# Patient Record
Sex: FEMALE | Race: WHITE | Employment: FULL TIME | URBAN - METROPOLITAN AREA
[De-identification: names, ages, dates, MRNs, and addresses within clinical notes are randomized per-mention and may not be internally consistent; named-entity substitution may affect disease eponyms.]

---

## 2018-03-27 ENCOUNTER — OFFICE VISIT (OUTPATIENT)
Dept: ORTHOPEDIC SURGERY | Age: 63
End: 2018-03-27

## 2018-03-27 VITALS
HEART RATE: 61 BPM | WEIGHT: 201 LBS | RESPIRATION RATE: 15 BRPM | SYSTOLIC BLOOD PRESSURE: 191 MMHG | DIASTOLIC BLOOD PRESSURE: 87 MMHG | HEIGHT: 63 IN | OXYGEN SATURATION: 97 % | TEMPERATURE: 98.6 F | BODY MASS INDEX: 35.61 KG/M2

## 2018-03-27 DIAGNOSIS — M54.12 CERVICAL RADICULOPATHY: ICD-10-CM

## 2018-03-27 DIAGNOSIS — R29.898 LEFT ARM WEAKNESS: Primary | ICD-10-CM

## 2018-03-27 DIAGNOSIS — M79.18 MYOFASCIAL PAIN: ICD-10-CM

## 2018-03-27 RX ORDER — FENOFIBRATE 160 MG/1
TABLET ORAL
COMMUNITY
Start: 2018-02-21 | End: 2021-12-02 | Stop reason: SDUPTHER

## 2018-03-27 RX ORDER — METFORMIN HYDROCHLORIDE 500 MG/1
500 TABLET ORAL
COMMUNITY
End: 2021-12-02 | Stop reason: SDUPTHER

## 2018-03-27 RX ORDER — NEBIVOLOL HYDROCHLORIDE 10 MG/1
TABLET ORAL
COMMUNITY
Start: 2018-02-21 | End: 2021-12-02 | Stop reason: SDUPTHER

## 2018-03-27 RX ORDER — VALSARTAN 320 MG/1
TABLET ORAL
COMMUNITY
Start: 2018-03-20

## 2018-03-27 RX ORDER — PREDNISONE 10 MG/1
TABLET ORAL
Qty: 21 TAB | Refills: 0 | Status: SHIPPED | OUTPATIENT
Start: 2018-03-27 | End: 2018-04-23 | Stop reason: ALTCHOICE

## 2018-03-27 RX ORDER — CYCLOBENZAPRINE HCL 10 MG
TABLET ORAL
Refills: 0 | COMMUNITY
Start: 2018-03-15

## 2018-03-27 RX ORDER — ATORVASTATIN CALCIUM 40 MG/1
TABLET, FILM COATED ORAL
Refills: 1 | COMMUNITY
Start: 2018-02-13

## 2018-03-27 NOTE — MR AVS SNAPSHOT
[facility]. [facility] [address] [place] [id number] 
[phone] Patient: Sheyla Connolly MRN: [id number] HXI:[id number] Visit Information Date & Time Provider Department Dept. Phone Encounter #  
 3/27/2018  1:00 PM Kenney Bowling MD [facility] [phone] [id number] Follow-up Instructions Return in about 2 weeks (around 4/10/2018). Upcoming Health Maintenance Date Due Hepatitis C Screening 1955 DTaP/Tdap/Td series (1 - Tdap) 4/12/1977 PAP AKA CERVICAL CYTOLOGY 4/12/1977 BREAST CANCER SCRN MAMMOGRAM 4/12/2006 FOBT Q 1 YEAR AGE 50-75 4/12/2006 ZOSTER VACCINE AGE 60> 2/12/2016 Influenza Age 5 to Adult 8/1/2017 Allergies as of 3/27/2018  Review Complete On: 3/27/2018 By: Keyanna Navas No Known Allergies Current Immunizations  Never Reviewed No immunizations on file. Not reviewed this visit You Were Diagnosed With   
  
 Codes Comments Left arm weakness    -  Primary ICD-10-CM: R29.898 ICD-9-CM: 729.89 Cervical radiculopathy     ICD-10-CM: M54.12 
ICD-9-CM: 723.4 Myofascial pain     ICD-10-CM: M79.1 ICD-9-CM: 729.1 Vitals BP Pulse Temp Resp Height(growth percentile) Weight(growth percentile) 191/87 61 98.6 °F (37 °C) 15 5' 3\" (1.6 m) 201 lb (91.2 kg) SpO2 BMI Smoking Status 97% 35.61 kg/m2 Never Smoker BMI and BSA Data Body Mass Index Body Surface Area  
 35.61 kg/m 2 2.01 m 2 Preferred Pharmacy Pharmacy Name Phone [id number], [address] [place] [address] Your Updated Medication List  
  
   
This list is accurate as of 3/27/18  2:14 PM.  Always use your most recent med list.  
  
  
  
  
 atorvastatin 40 mg tablet Commonly known as:  LIPITOR  
TAKE 1 TABLET BY MOUTH EVERY DAY FOR CHOLESTEROL BYSTOLIC 10 mg tablet Generic drug:  nebivolol  
  
 cyclobenzaprine 10 mg tablet Commonly known as:  FLEXERIL  
TAKE 1 TAB BEFORE BEDTIME, CAUTION SEDATION. CAN TAKE 3 X A DAY WHEN NOT WORKING/OR DRIVING. fenofibrate 160 mg tablet Commonly known as:  LOFIBRA  
  
 metFORMIN 500 mg tablet Commonly known as:  GLUCOPHAGE  
500 mg.  
  
 predniSONE 10 mg dose pack Commonly known as:  STERAPRED DS See administration instruction per 10mg dose pack  
  
 valsartan 320 mg tablet Commonly known as:  DIOVAN Prescriptions Sent to Pharmacy Refills  
 predniSONE (STERAPRED DS) 10 mg dose pack 0 Sig: See administration instruction per 10mg dose pack Class: Normal  
 Pharmacy: [address] [place] [address] 107, Maxime Yu [address] Ph #: [phone] We Performed the Following REFERRAL TO PHYSICAL THERAPY [[address]] Comments:  
 eval and treat Neck pain, L>R Include dry needling protocol Follow-up Instructions Return in about 2 weeks (around 4/10/2018). To-Do List   
 04/03/2018 Imaging:  MRI CERV SPINE WO CONT   
  
 04/04/2018 1:30 PM  
  Appointment with [facility] MRI  1 at [address] [place] [address] ([phone]) GENERAL INSTRUCTIONS  Bring information (ID card) if you have any medically implanted devices. You will be required to lie still while the procedure is being performed. Remove any jewelry (including body piercing, hairpins) prior to MRI. If you have had a creatinine level drawn within the past 30 days, please bring most recent results to your appt. Bring any films, CD's, and reports related to your study with you on the day of your exam.  This only includes studies done outside of [address] [place] [address], Sal , Claribel, and Kendy.   Bring a complete list of all medications you are currently taking to include prescriptions, over-the-counter meds, herbals, vitamins & any dietary supplements. If you were given medications for claustrophobia or anxiety, please arrange to have someone drive you to your appointment. QUESTIONS  Notify the MRI Department if you have any questions concerning your study. Geni Dash - [phone] [address] Samantha Andrews - [phone] Referral Information Referral ID Referred By Referred To  
  
 [id number] [address] [place], [address] Phone: [phone] Visits Status Start Date End Date 1 New Request 3/27/18 3/27/19 If your referral has a status of pending review or denied, additional information will be sent to support the outcome of this decision. Introducing [facility]! Veena Dye introduces SANDOW patient portal. Now you can access parts of your medical record, email your doctor's office, and request medication refills online. 1. In your internet browser, go to [email or web address] 2. Click on the First Time User? Click Here link in the Sign In box. You will see the New Member Sign Up page. 3. Enter your SANDOW Access Code exactly as it appears below. You will not need to use this code after youve completed the sign-up process. If you do not sign up before the expiration date, you must request a new code. · SANDOW Access Code: [email or web address] Expires: 6/25/2018  2:09 PM 
 
4. Enter the last four digits of your Social Security Number (xxxx) and Date of Birth (mm/dd/yyyy) as indicated and click Submit. You will be taken to the next sign-up page. 5. Create a ITaot ID. This will be your SANDOW login ID and cannot be changed, so think of one that is secure and easy to remember. 6. Create a SANDOW password. You can change your password at any time. 7. Enter your Password Reset Question and Answer. This can be used at a later time if you forget your password. 8. Enter your e-mail address. You will receive e-mail notification when new information is available in [address] 9. Click Sign Up. You can now view and download portions of your medical record. 10. Click the Download Summary menu link to download a portable copy of your medical information. If you have questions, please visit the Frequently Asked Questions section of the SEDLine website. Remember, SEDLine is NOT to be used for urgent needs. For medical emergencies, dial 911. Now available from your iPhone and Android! Please provide this summary of care documentation to your next provider. Your primary care clinician is listed as Mike Tyler. If you have any questions after today's visit, please call [phone].

## 2018-03-27 NOTE — PROGRESS NOTES
[address], [address]  [place] [address]  Phone: [phone]  Fax: [phone]        Stew Mcfarland  : 1955  PCP: No primary care provider on file. 3/27/2018    NEW PATIENT      ASSESSMENT AND PLAN     Rosey Bates comes in to the office today c/o chronic neck and left shoulder pain with occasional paraesthesia in her LUE, worse over the last 2 weeks. She rates her pain as an 8/10 today. Her paraesthesia crosses multiple dermatomes, particularly C5-C8. The majority of her pain is in her neck. On examination, she had a generalized weakness in her LUE and a negative Hawkin's sign on her L. She had tenderness to palpation of her trapezius and levator scapulae on the L with trigger points. Her pain is likely myofascial in nature, but her generalized LUE weakness is worrying. I referred for a cervical MRI for further evaluation of her LUE weakness. I also prescribed a prednisone pack and referred to PT with dry needling. I advised on proper biomechanics and posture. Pt will f/u in 2 weeks or sooner if needed. Diagnoses and all orders for this visit:    1. Left arm weakness  -     MRI CERV SPINE WO CONT; Future    2. Cervical radiculopathy  -     MRI CERV SPINE WO CONT; Future  -     predniSONE (STERAPRED DS) 10 mg dose pack; See administration instruction per 10mg dose pack    3. Myofascial pain  -     REFERRAL TO PHYSICAL THERAPY         Follow-up Disposition: Not on File    CHIEF COMPLAINT  Rosey Bates is seen today in consultation at the request of No primary care provider on file. for complaints of neck pain. HISTORY OF PRESENT ILLNESS  Rosey Bates is a 65 y.o. female c/o chronic neck pain radiating into her left shoulder and she has occasional paraesthesia in her LUE, worse over the last 2 weeks. She had a cortisone shot in her left shoulder that relieved 80% of her pain.  She has also had massage, ice and heat therapy, and Flexeril at night that have all provided temporary relief of her pain. She notes her pain is worse at the end of the work day than at the beginning. She works as a . She is accompanied by her . Pt denies any fevers, chills, nausea, vomiting. Pt denies any chest pain and shortness of breath. Pt denies any ear, nose, and throat problems. Pt denies any fecal or urinary incontinence. PAST MEDICAL HISTORY   No past medical history on file. No past surgical history on file. MEDICATIONS        ALLERGIES  Allergies not on file       SOCIAL HISTORY    Social History     Social History    Marital status: UNKNOWN     Spouse name: N/A    Number of children: N/A    Years of education: N/A     Social History Main Topics    Smoking status: Not on file    Smokeless tobacco: Not on file    Alcohol use Not on file    Drug use: Not on file    Sexual activity: Not on file     Other Topics Concern    Not on file     Social History Narrative       FAMILY HISTORY  No family history on file. REVIEW OF SYSTEMS  Review of Systems   Musculoskeletal: Positive for neck pain. LUE paraesthesia         PHYSICAL EXAMINATION  Visit Vitals    /87    Pulse 61    Temp 98.6 °F (37 °C)    Resp 15    Ht 5' 3\" (1.6 m)    Wt 201 lb (91.2 kg)    SpO2 97%    BMI 35.61 kg/m2         No flowsheet data found. Constitutional:  Well developed, well nourished, in no acute distress. Psychiatric: Affect and mood are appropriate. HEENT: Normocephalic, atraumatic. Extraocular movements intact. Integumentary: No rashes or abrasions noted on exposed areas. Cardiovascular: Regular rate and rhythm. Pulmonary: Clear to auscultation bilaterally. SPINE/MUSCULOSKELETAL EXAM    Cervical spine:  Neck is midline. Normal muscle tone. No focal atrophy is noted. ROM pain free. Shoulder ROM intact. Tenderness to palpation of trapezius and levator scapulae on the L.   Negative Spurling's sign. Negative Tinel's sign. Negative Baig's sign. Sensation in the bilateral arms grossly intact to light touch. Lumbar spine:  No rash, ecchymosis, or gross obliquity. No fasciculations. No focal atrophy is noted. No pain with hip ROM. Full range of motion. No tenderness to palpation. No tenderness to palpation at the sciatic notch. SI joints non-tender. Trochanters non tender. Sensation in the bilateral legs grossly intact to light touch. MOTOR:      Biceps  Triceps Deltoids Wrist Ext Wrist Flex Hand Intrin   Right 5/5 5/5 5/5 5/5 5/5 5/5   Left 5/5 5/5 4/5 4/5 5/5 5/5             Hip Flex  Quads Hamstrings Ankle DF EHL Ankle PF   Right 5/5 5/5 5/5 5/5 5/5 5/5   Left 5/5 5/5 5/5 5/5 5/5 5/5   Pain limited exam due to left shoulder pain. DTRs are 1+ biceps, triceps, brachioradialis, patella, and Achilles. Negative Straight Leg raise. Squat not tested. No difficulty with tandem gait. Ambulation without assistive device. FWB. RADIOGRAPHS  Cervical spine XR with US images taken on 3/16/18 personally reviewed with patient:  FINDINGS:     Some views are limited by motion. There is straightening of the normal cervical lordosis. The anterior neural arch of C1 is in close approximation to the odontoid process with proper alignment of the lateral masses of C1 and C2. Prevertebral soft tissues are unremarkable. Vertebral body heights are maintained. There is intervertebral disc space narrowing with endplate osteophytosis from C4-C7. There are degenerative changes throughout the facet and uncovertebral joints resulting in mild left neuroforaminal narrowing at C4-C5 and mild right neuroforaminal narrowing at C4-C5 and C5-C6. The lung apices are clear. IMPRESSION:     Degenerative disc disease from C4-C7 with diffuse cervical facet arthropathy. There is mild bilateral neuroforaminal stenosis.  If there are true radicular type symptoms, consider MRI for further evaluation.  reviewed    Ms. Daquan Perry has a reminder for a \"due or due soon\" health maintenance. I have asked that she contact her primary care provider for follow-up on this health maintenance. 24 minutes of face-to-face contact were spent with the patient during today's visit extensively discussing symptoms and treatment plan. All questions were answered. More than half of this visit today was spent on counseling. Written by Jason Lao, as dictated by Dr. Yuriy Gray. I, Dr. Yuriy Gray, confirm that all documentation is accurate.

## 2018-03-29 ENCOUNTER — HOSPITAL ENCOUNTER (OUTPATIENT)
Dept: PHYSICAL THERAPY | Age: 63
Discharge: HOME | End: 2018-03-29
Payer: COMMERCIAL

## 2018-03-29 PROCEDURE — 97110 THERAPEUTIC EXERCISES: CPT

## 2018-03-29 PROCEDURE — 97162 PT EVAL MOD COMPLEX 30 MIN: CPT

## 2018-03-29 NOTE — PROGRESS NOTES
PT DAILY TREATMENT NOTE     Patient Name: Regina Yung  Date:3/29/2018  : 1955  [x]  Patient  Verified  Payor: [place] [place] / Plan: Rosalie White [id number] PPO / Product Type: PPO /    In time:   Out time:180  Total Treatment Time (min): 60  Visit #: 1 of 8    Treatment Area: Myalgia [M79.1]    SUBJECTIVE  Pain Level (0-10 scale): 4  Any medication changes, allergies to medications, adverse drug reactions, diagnosis change, or new procedure performed?: [x] No    [] Yes (see summary sheet for update)  Subjective functional status/changes:   [] No changes reported  See eval    OBJECTIVE    35 min [x]Eval                  []Re-Eval       25 min Therapeutic Exercise:  [x] See flow sheet :    Rationale: increase ROM, increase strength, improve balance and increase proprioception to improve the patients ability to increase ease of ADLs     With   [] TE   [] TA   [] neuro   [] other: Patient Education: [x] Review HEP    [] Progressed/Changed HEP based on:   [] positioning   [] body mechanics   [] transfers   [] heat/ice application    [] other:      Other Objective/Functional Measures: see eval      Pain Level (0-10 scale) post treatment: 5    ASSESSMENT/Changes in Function: see eval    Patient will continue to benefit from skilled PT services to modify and progress therapeutic interventions, address functional mobility deficits, address ROM deficits, address strength deficits, analyze and address soft tissue restrictions, analyze and cue movement patterns and analyze and modify body mechanics/ergonomics to attain remaining goals. []  See Plan of Care  []  See progress note/recertification  []  See Discharge Summary         Progress towards goals / Updated goals:  Short Term Goals: To be accomplished in 2 weeks:  1. Pt will be I and compliant with HEP 2x/day to promote self management of condition  2. Pt will demonstrate pain free left shoulder ABD to 90 deg for improved ease of ADLs. Long Term Goals: To be accomplished in 4 weeks:  1. Pt will improve FOTO to 63 to indicate improved function with daily activities  2. Pt will demonstrate shoulder flexion to 120 deg without pain or shoulder hiking for improved functional mobility. 3. Pt will demonstrate left shoulder FIR to L2 without pain for improved ease of dressing. 4. Pt will report little difficulty lifting an object overhead for return to PLOF.     PLAN  []  Upgrade activities as tolerated     [x]  Continue plan of care  []  Update interventions per flow sheet       []  Discharge due to:_  []  Other:_      Mariusz Camargo 3/29/2018  6:07 PM    Future Appointments  Date Time Provider Cesar Ji   4/4/2018 1:30 PM [facility] MRI RM 1 [facility]   4/23/2018 3:45 PM Jeannie Rivera  [address]

## 2018-03-29 NOTE — PROGRESS NOTES
[facility]  [address] [facility] [address]  [place], [address]  [phone] fax    Plan of Care/ Statement of Necessity for Physical Therapy Services    Patient name: Ubaldo Fernando Start of Care: 3/29/2018   Referral source: Trisha Laguerre MD : 1955    Medical Diagnosis: Myalgia [M79.1]   Onset Date: summer 2017   Treatment Diagnosis: left shoulder and neck pain   Prior Hospitalization: see medical history Provider#: [id number]   Medications: Verified on Patient summary List    Comorbidities: DM, HTN   Prior Level of Function: I with ADLs and IADLs     The Plan of Care and following information is based on the information from the initial evaluation. Assessment/ key information: Pt is a 66 y/o F presenting with c/o insidious left shoulder pain and bilateral neck pain that began in the summer of 2017 and has progressively gotten worse. Imagine was negative, but she will be getting an MRI next week. Pt demonstrates good available strength in left shoulders and elbow. While pt initially demonstrated elbow extension and wrist flexion weakness, at end of session retesting showed no asymmetry. (-) Spurling's B today with no asymmetrical cervical rotation or SB noted. Limited by painful shoulder ROM (both active and passive) past 90 deg flexion and ABD, difficult to assess end-feel  Limited and painful FIR>BASILIA. TTP in left UT, scalenes, anterior shoulder and pecs. Pt signs and symptoms appear consistent with referring diagnosis, possible pec involvement also. Will trial PT for improved pain free functional mobility and ADL performance.     Evaluation Complexity History MEDIUM  Complexity : 1-2 comorbidities / personal factors will impact the outcome/ POC ; Examination MEDIUM Complexity : 3 Standardized tests and measures addressing body structure, function, activity limitation and / or participation in recreation  ;Presentation MEDIUM Complexity : Evolving with changing characteristics  ; Clinical Decision Making MEDIUM Complexity : FOTO score of 26-74  Overall Complexity Rating: MEDIUM  Problem List: pain affecting function, decrease ROM, decrease strength, edema affecting function, decrease ADL/ functional abilitiies, decrease activity tolerance and decrease flexibility/ joint mobility   Treatment Plan may include any combination of the following: Therapeutic exercise, Therapeutic activities, Neuromuscular re-education, Physical agent/modality, Manual therapy, Patient education and Self Care training  Patient / Family readiness to learn indicated by: asking questions, trying to perform skills and interest  Persons(s) to be included in education: patient (P)  Barriers to Learning/Limitations: None  Patient Goal (s): to feel better  Patient Self Reported Health Status: good  Rehabilitation Potential: good    Short Term Goals: To be accomplished in 2 weeks:  1. Pt will be I and compliant with HEP 2x/day to promote self management of condition  2. Pt will demonstrate pain free left shoulder ABD to 90 deg for improved ease of ADLs. Long Term Goals: To be accomplished in 4 weeks:  1. Pt will improve FOTO to 63 to indicate improved function with daily activities  2. Pt will demonstrate shoulder flexion to 120 deg without pain or shoulder hiking for improved functional mobility. 3. Pt will demonstrate left shoulder FIR to L2 without pain for improved ease of dressing. 4. Pt will report little difficulty lifting an object overhead for return to PLOF. Frequency / Duration: Patient to be seen 2 times per week for 4 weeks.     Patient/ Caregiver education and instruction: Diagnosis, prognosis, self care, activity modification and exercises   [x]  Plan of care has been reviewed with LENNIE Mojica 3/29/2018 5:50 PM    ________________________________________________________________________    I certify that the above Therapy Services are being furnished while the patient is under my care. I agree with the treatment plan and certify that this therapy is necessary.     [facility] Signature:____________________  Date:____________Time: _________    Please sign and return to [facility]  [address] [facility] [address]  [place], [address]  [phone] fax

## 2018-04-03 ENCOUNTER — HOSPITAL ENCOUNTER (OUTPATIENT)
Dept: PHYSICAL THERAPY | Age: 63
Discharge: HOME | End: 2018-04-03
Payer: COMMERCIAL

## 2018-04-03 PROCEDURE — 97140 MANUAL THERAPY 1/> REGIONS: CPT

## 2018-04-03 PROCEDURE — 97110 THERAPEUTIC EXERCISES: CPT

## 2018-04-03 NOTE — PROGRESS NOTES
PT DAILY TREATMENT NOTE     Patient Name: Justyna Urbina  Date:4/3/2018  : 1955  [x]  Patient  Verified  Payor: BLUE [employer] / Plan: [facility] PPO / Product Type: PPO /    In time:5:08  Out time:5:53  Total Treatment Time (min): 45  Visit #: 2 of 8    Treatment Area: Myalgia [M79.1]    SUBJECTIVE  Pain Level (0-10 scale): 0/10  Any medication changes, allergies to medications, adverse drug reactions, diagnosis change, or new procedure performed?: [x] No    [] Yes (see summary sheet for update)  Subjective functional status/changes:   [x] No changes reported    OBJECTIVE    35 min Therapeutic Exercise:  [x] See flow sheet :   Rationale: increase ROM, increase strength and increase proprioception to improve the patients ability to perform ADL's. 10 min Manual Therapy:  Left ST/GH joint mobs, shoulder PROM, TPR Left lev scap, subscap. DTM deltoid. Rationale: decrease pain, increase ROM, increase tissue extensibility and decrease trigger points to improve activity tolerance. With   [x] TE   [] TA   [] neuro   [] other: Patient Education: [x] Review HEP    [] Progressed/Changed HEP based on:   [] positioning   [] body mechanics   [] transfers   [] heat/ice application    [] other:      Other Objective/Functional Measures: Initiated exercises per flow sheet. Pain Level (0-10 scale) post treatment: 210    ASSESSMENT/Changes in Function: First F/U visit. Improved Left Shoulder ER mobility post-manual.    Patient will continue to benefit from skilled PT services to modify and progress therapeutic interventions, address functional mobility deficits, address ROM deficits, address strength deficits, analyze and address soft tissue restrictions and analyze and modify body mechanics/ergonomics to attain remaining goals.      [x]  See Plan of Care  []  See progress note/recertification  []  See Discharge Summary         Progress towards goals / Updated goals:  Short Term Goals: To be accomplished in 2 weeks:  1. Pt will be I and compliant with HEP 2x/day to promote self management of condition - Pt reports HEP compliance. 4/3/2018  2. Pt will demonstrate pain free left shoulder ABD to 90 deg for improved ease of ADLs. [address] be accomplished in 4 weeks:  1. Pt will improve FOTO to 63 to indicate improved function with daily activities  2. Pt will demonstrate shoulder flexion to 120 deg without pain or shoulder hiking for improved functional mobility. 3. Pt will demonstrate left shoulder FIR to L2 without pain for improved ease of dressing. 4. Pt will report little difficulty lifting an object overhead for return to PLOF.     PLAN  []  Upgrade activities as tolerated     [x]  Continue plan of care  []  Update interventions per flow sheet       []  Discharge due to:_  []  Other:_      Dartha Collet, PTA 4/3/2018  5:09 PM    Future Appointments  Date Time Provider Cesar Ji   4/3/2018 5:30 PM Dartha Collet, PTA [facility]   4/4/2018 1:30 PM [facility] MRI RM 1 [facility]   4/5/2018 5:00 PM Juliet Drew [facility]   4/10/2018 5:00 PM Dartha Collet, PTA [facility]   4/12/2018 3:30 PM [id number] [employer] [facility]   4/16/2018 4:30 PM Dartha Collet, LENNIE [facility]   4/23/2018 3:45 PM Jhon Dillard  [address]   4/23/2018 5:00 PM [id number] [employer] [facility]   4/25/2018 5:00 PM Dartha Collet, PTA [facility]

## 2018-04-04 ENCOUNTER — TELEPHONE (OUTPATIENT)
Dept: ORTHOPEDIC SURGERY | Age: 63
End: 2018-04-04

## 2018-04-04 ENCOUNTER — HOSPITAL ENCOUNTER (OUTPATIENT)
Age: 63
Discharge: HOME | End: 2018-04-04
Attending: PHYSICAL MEDICINE & REHABILITATION
Payer: COMMERCIAL

## 2018-04-04 DIAGNOSIS — M54.12 CERVICAL RADICULOPATHY: ICD-10-CM

## 2018-04-04 DIAGNOSIS — R29.898 LEFT ARM WEAKNESS: ICD-10-CM

## 2018-04-04 PROCEDURE — 72141 MRI NECK SPINE W/O DYE: CPT

## 2018-04-04 NOTE — TELEPHONE ENCOUNTER
Patient is asking for her shldr to be added to the MRI of cervical spine which she is to have today at 1:30 pm  The therapist who evaluated her feel her pain in shdlr can be also coming from her rotator cuff area also. Please call the MRI facility to add the shldr area to the neck MRI.   Patient can be reached at [phone]

## 2018-04-04 NOTE — TELEPHONE ENCOUNTER
Called patient, name and  were verified. I explained to patient per MD Yara Brooks that a referral is needed for and MD who specializes in that scope of practice. Patient verbalized understanding and wishes to hold off on referral. No further action needed at this time.

## 2018-04-05 ENCOUNTER — HOSPITAL ENCOUNTER (OUTPATIENT)
Dept: PHYSICAL THERAPY | Age: 63
Discharge: HOME | End: 2018-04-05
Payer: COMMERCIAL

## 2018-04-05 PROCEDURE — 97110 THERAPEUTIC EXERCISES: CPT

## 2018-04-05 PROCEDURE — 97112 NEUROMUSCULAR REEDUCATION: CPT

## 2018-04-05 PROCEDURE — 97140 MANUAL THERAPY 1/> REGIONS: CPT

## 2018-04-05 NOTE — PROGRESS NOTES
PT DAILY TREATMENT NOTE     Patient Name: Keaton Schumacher  Date:2018  : 1955  [x]  Patient  Verified  Payor: [place]UE [place] / Plan: Rosalie White [id number] PPO / Product Type: PPO /    In time:5:00  Out time:5:41  Total Treatment Time (min): 41  Visit #: 3 of 8    Treatment Area: Myalgia [M79.1]    SUBJECTIVE  Pain Level (0-10 scale): 2  Any medication changes, allergies to medications, adverse drug reactions, diagnosis change, or new procedure performed?: [x] No    [] Yes (see summary sheet for update)  Subjective functional status/changes:   [] No changes reported  Pt reports some lateral left shoulder soreness/tightness.  \" I can tell its helping though\"    OBJECTIVE    Modality rationale: PD to improve the patients ability to    Min Type Additional Details    [] Estim:  []Unatt       []IFC  []Premod                        []Other:  []w/ice   []w/heat  Position:  Location:    [] Estim: []Att    []TENS instruct  []NMES                    []Other:  []w/US   []w/ice   []w/heat  Position:  Location:    []  Traction: [] Cervical       []Lumbar                       [] Prone          []Supine                       []Intermittent   []Continuous Lbs:  [] before manual  [] after manual    []  Ultrasound: []Continuous   [] Pulsed                           []1MHz   []3MHz W/cm2:  Location:    []  Iontophoresis with dexamethasone         Location: [] Take home patch   [] In clinic    []  Ice     []  heat  []  Ice massage  []  Laser   []  Anodyne Position:  Location:    []  Laser with stim  []  Other:  Position:  Location:    []  Vasopneumatic Device Pressure:       [] lo [] med [] hi   Temperature: [] lo [] med [] hi   [] Skin assessment post-treatment:  []intact []redness- no adverse reaction    []redness - adverse reaction:       23 min Therapeutic Exercise:  [] See flow sheet :   Rationale: increase ROM and increase strength to improve the patients ability to perform ADLs    8 min Neuromuscular Re-education:  []  See flow sheet :   Rationale: improve coordination and increase proprioception  to improve the patients ability to cervical flexor activation and mechanics     10 min Manual Therapy:  PROM left shoulder, post/inf mobs grade II-III, gentle capsule stretching, STJ mobs left   Rationale: decrease pain, increase ROM and increase tissue extensibility to improve ease of ADL performance              With   [] TE   [] TA   [] neuro   [] other: Patient Education: [x] Review HEP    [] Progressed/Changed HEP based on:   [] positioning   [] body mechanics   [] transfers   [] heat/ice application    [] other:      Other Objective/Functional Measures:      Pain Level (0-10 scale) post treatment: 3    ASSESSMENT/Changes in Function: Pt reports slowly improving mobility at this time with varying pain levels through lateral shoulder. She recently completed MRI of cervical spine that shows age related degenerative changes. Continue to progress shoulder mechanics and mobility     Patient will continue to benefit from skilled PT services to modify and progress therapeutic interventions, address functional mobility deficits, address ROM deficits, address strength deficits, analyze and address soft tissue restrictions, analyze and cue movement patterns and analyze and modify body mechanics/ergonomics to attain remaining goals. []  See Plan of Care  []  See progress note/recertification  []  See Discharge Summary           Progress towards goals / Updated goals:  Short Term Goals: To be accomplished in 2 weeks:  1. Pt will be I and compliant with HEP 2x/day to promote self management of condition - Pt reports HEP compliance. 4/3/2018  2. Pt will demonstrate pain free left shoulder ABD to 90 deg for improved ease of ADLs. [address] be accomplished in 4 weeks:  1. Pt will improve FOTO to 63 to indicate improved function with daily activities  2.  Pt will demonstrate shoulder flexion to 120 deg without pain or shoulder hiking for improved functional mobility. 3. Pt will demonstrate left shoulder FIR to L2 without pain for improved ease of dressing. 4. Pt will report little difficulty lifting an object overhead for return to PLOF.     PLAN  []  Upgrade activities as tolerated     []  Continue plan of care  []  Update interventions per flow sheet       []  Discharge due to:_  []  Other:_      Ellen Correa DPT, CMTPT 4/5/2018  5:18 PM    Future Appointments  Date Time Provider Cesar Gabrieli   4/10/2018 5:00 PM [facility]   4/12/2018 3:30 PM [id number] [place] [address]   4/16/2018 4:30 PM [facility]   4/23/2018 3:45 PM Annamarie Guillen  [address]    4/23/2018 5:00 PM [address] [place] [address]   4/25/2018 5:00 PM Nuvia Hernández PTA [facility]

## 2018-04-10 ENCOUNTER — HOSPITAL ENCOUNTER (OUTPATIENT)
Dept: PHYSICAL THERAPY | Age: 63
Discharge: HOME | End: 2018-04-10
Payer: COMMERCIAL

## 2018-04-10 PROCEDURE — 97140 MANUAL THERAPY 1/> REGIONS: CPT

## 2018-04-10 PROCEDURE — 97032 APPL MODALITY 1+ESTIM EA 15: CPT

## 2018-04-10 PROCEDURE — 97110 THERAPEUTIC EXERCISES: CPT

## 2018-04-10 NOTE — PROGRESS NOTES
PT DAILY TREATMENT NOTE     Patient Name: Chris Colon  Date:4/10/2018  : 1955   [x]  Patient  Verified  Payor: [place] [place] / [place] [facility] [place] / Product Type: PPO /    In time:5:02  Out time:5:42  Total Treatment Time (min): 40  Visit #: 4 of 8    Treatment Area: Myalgia [M79.1]    SUBJECTIVE  Pain Level (0-10 scale): 0/10  Any medication changes, allergies to medications, adverse drug reactions, diagnosis change, or new procedure performed?: [x] No    [] Yes (see summary sheet for update)  Subjective functional status/changes:   [] No changes reported  \"It's doing a bit better. Still hurts if I move it certain positions. \"    OBJECTIVE    Modality rationale: decrease pain and increase tissue extensibility to improve the patients ability to perform ADL's.    Min Type Additional Details    [] Estim:  []Unatt       []IFC  []Premod                        []Other:  []w/ice   []w/heat  Position:  Location:   8' [x] Estim: [x]Att    []TENS instruct  []NMES                    [x]Other: [employer] Combo [x]w/US   []w/ice   []w/heat  Position: Seated  Location: Left UT/lev scap    []  Traction: [] Cervical       []Lumbar                       [] Prone          []Supine                       []Intermittent   []Continuous Lbs:  [] before manual  [] after manual    []  Ultrasound: []Continuous   [] Pulsed                           []1MHz   []3MHz W/cm2:  Location:    []  Iontophoresis with dexamethasone         Location: [] Take home patch   [] In clinic    []  Ice     []  heat  []  Ice massage  []  Laser   []  Anodyne Position:  Location:    []  Laser with stim  []  Other:  Position:  Location:    []  Vasopneumatic Device Pressure:       [] lo [] med [] hi   Temperature: [] lo [] med [] hi   [] Skin assessment post-treatment:  []intact []redness- no adverse reaction    []redness - adverse reaction:     24 min Therapeutic Exercise:  [x] See flow sheet :   Rationale: increase ROM, increase strength and increase proprioception to improve the patients ability to perform ADL's.    8 min Manual Therapy:  Left ST/GH joint mobs, DTM/TPR Left Ut, lev scap, infraspinatus. Shoulder PROM. Rationale: decrease pain, increase ROM, increase tissue extensibility and decrease trigger points to improve OH activity tolerance. With   [x] TE   [] TA   [] neuro   [] other: Patient Education: [x] Review HEP    [] Progressed/Changed HEP based on:   [] positioning   [] body mechanics   [] transfers   [] heat/ice application    [] other:      Other Objective/Functional Measures: Added t-band rows, ext, IR and ER 10x each. AROM Left shoulder abd 120 degrees. Trial US/E-stim combo. Pain Level (0-10 scale) post treatment: 0/10 at rest.    ASSESSMENT/Changes in Function: Pt reported a decrease in tenson post-treatment. Improved AROM since IE. Patient will continue to benefit from skilled PT services to modify and progress therapeutic interventions, address functional mobility deficits, address ROM deficits, address strength deficits, analyze and address soft tissue restrictions and analyze and modify body mechanics/ergonomics to attain remaining goals. [x]  See Plan of Care  []  See progress note/recertification  []  See Discharge Summary         Progress towards goals / Updated goals:  Short Term Goals: To be accomplished in 2 weeks:  1. Pt will be I and compliant with HEP 2x/day to promote self management of condition - Pt reports HEP compliance. 4/3/2018  2. Pt will demonstrate pain free left shoulder ABD to 90 deg for improved ease of ADLs.  - Met, 120 degrees. 4/10/2018  Long Term Goals: To be accomplished in 4 weeks:  1. Pt will improve FOTO to 63 to indicate improved function with daily activities  2. Pt will demonstrate shoulder flexion to 120 deg without pain or shoulder hiking for improved functional mobility. 3. Pt will demonstrate left shoulder FIR to L2 without pain for improved ease of dressing.   4. Pt will report little difficulty lifting an object overhead for return to [facility].     PLAN  []  Upgrade activities as tolerated     [x]  Continue plan of care  []  Update interventions per flow sheet       []  Discharge due to:_  []  Other:_      Keli Schulz PTA 4/10/2018  5:05 PM    Future Appointments  Date Time Provider Cesar Ji   4/12/2018 3:30 PM [id number] [facility]   4/16/2018 4:30 PM [facility]   4/23/2018 3:45 PM [place]en Wood  [address]   4/23/2018 5:00 PM [id number] [facility]   4/25/2018 5:00 PM Keli Schulz PTA [facility]

## 2018-04-12 ENCOUNTER — HOSPITAL ENCOUNTER (OUTPATIENT)
Dept: PHYSICAL THERAPY | Age: 63
Discharge: HOME | End: 2018-04-12
Payer: COMMERCIAL

## 2018-04-12 PROCEDURE — 97032 APPL MODALITY 1+ESTIM EA 15: CPT

## 2018-04-12 PROCEDURE — 97110 THERAPEUTIC EXERCISES: CPT

## 2018-04-12 PROCEDURE — 97140 MANUAL THERAPY 1/> REGIONS: CPT

## 2018-04-12 NOTE — PROGRESS NOTES
PT DAILY TREATMENT NOTE     Patient Name: Ubaldo Fernando  Date:2018  : 1955  [x]  Patient  Verified  Payor: [place] / [place] [facility] [place] / Product Type: PPO /    In time:3:33  Out time:4:12  Total Treatment Time (min): 39  Visit #: 5 of 8    Treatment Area: Myalgia [M79.1]    SUBJECTIVE  Pain Level (0-10 scale): 0  Any medication changes, allergies to medications, adverse drug reactions, diagnosis change, or new procedure performed?: [x] No    [] Yes (see summary sheet for update)  Subjective functional status/changes:   [] No changes reported  \"I can tell its better, I can do more in different positions\"    OBJECTIVE    Modality rationale: decrease pain and increase tissue extensibility to improve the patients ability to improve shoulder mechanics   Min Type Additional Details    [] Estim:  []Unatt       []IFC  []Premod                        []Other:  []w/ice   []w/heat  Position:  Location:   5+3 [x] Estim: [x]Att    []TENS instruct  []NMES                    [x]Other: combo [x]w/US   []w/ice   []w/heat  Position: seated  Location: left UT/LS    []  Traction: [] Cervical       []Lumbar                       [] Prone          []Supine                       []Intermittent   []Continuous Lbs:  [] before manual  [] after manual    []  Ultrasound: []Continuous   [] Pulsed                           []1MHz   []3MHz W/cm2:  Location:    []  Iontophoresis with dexamethasone         Location: [] Take home patch   [] In clinic    []  Ice     []  heat  []  Ice massage  []  Laser   []  Anodyne Position:  Location:    []  Laser with stim  []  Other:  Position:  Location:    []  Vasopneumatic Device Pressure:       [] lo [] med [] hi   Temperature: [] lo [] med [] hi   [] Skin assessment post-treatment:  []intact []redness- no adverse reaction    []redness - adverse reaction:         23 min Therapeutic Exercise:  [] See flow sheet :   Rationale: increase ROM and increase strength to improve the patients ability to perform ADLs with increased ease    8 min Manual Therapy:  Post/inf GH mobs grade II-III, gentle capsule stretching   Rationale: increase ROM and increase tissue extensibility to improve ease of ADLs and self care          With   [] TE   [] TA   [] neuro   [] other: Patient Education: [x] Review HEP    [] Progressed/Changed HEP based on:   [] positioning   [] body mechanics   [] transfers   [] heat/ice application    [] other:      Other Objective/Functional Measures: pt shows improved posterior capsule mobility, most limited into ER at this time     Pain Level (0-10 scale) post treatment: 0    ASSESSMENT/Changes in Function: Pt progressing quite well with shoulder mobility at this time. She is most limited into ER of left shoulder. Advised to focus stretching on this region as tolerated at home. Held DN today as pt reports good tolerance and success with combo last session. Patient will continue to benefit from skilled PT services to modify and progress therapeutic interventions, address functional mobility deficits, address ROM deficits, address strength deficits, analyze and address soft tissue restrictions, analyze and cue movement patterns and analyze and modify body mechanics/ergonomics to attain remaining goals. []  See Plan of Care  []  See progress note/recertification  []  See Discharge Summary         Progress towards goals / Updated goals:  Short Term Goals: To be accomplished in 2 weeks:  1. Pt will be I and compliant with HEP 2x/day to promote self management of condition - Pt reports HEP compliance. 4/3/2018  2. Pt will demonstrate pain free left shoulder ABD to 90 deg for improved ease of ADLs.  - Met, 120 degrees. 4/10/2018  Long Term Goals: To be accomplished in 4 weeks:  1. Pt will improve FOTO to 63 to indicate improved function with daily activities Met 68 4/12/18  2.  Pt will demonstrate shoulder flexion to 120 deg without pain or shoulder hiking for improved functional mobility. 3. Pt will demonstrate left shoulder FIR to L2 without pain for improved ease of dressing. Met FIR to L2 4/12/18  4. Pt will report little difficulty lifting an object overhead for return to PLOF.     PLAN  []  Upgrade activities as tolerated     [x]  Continue plan of care  []  Update interventions per flow sheet       []  Discharge due to:_  []  Other:_      Stephanie Harvey DPT, CMTPT 4/12/2018  3:33 PM    Future Appointments  Date Time Provider Cesar Gabrieli   4/16/2018 4:30 PM Destin Baig PTA [facility]   4/23/2018 3:45 PM Susan Ash  [address]    4/23/2018 5:00 PM [id number] [facility]   4/25/2018 5:00 PM Destin Baig PTA [facility]

## 2018-04-16 ENCOUNTER — APPOINTMENT (OUTPATIENT)
Dept: PHYSICAL THERAPY | Age: 63
End: 2018-04-16
Payer: COMMERCIAL

## 2018-04-23 ENCOUNTER — HOSPITAL ENCOUNTER (OUTPATIENT)
Dept: PHYSICAL THERAPY | Age: 63
Discharge: HOME | End: 2018-04-23
Payer: COMMERCIAL

## 2018-04-23 ENCOUNTER — OFFICE VISIT (OUTPATIENT)
Dept: ORTHOPEDIC SURGERY | Age: 63
End: 2018-04-23

## 2018-04-23 VITALS
HEART RATE: 72 BPM | RESPIRATION RATE: 18 BRPM | SYSTOLIC BLOOD PRESSURE: 200 MMHG | OXYGEN SATURATION: 98 % | DIASTOLIC BLOOD PRESSURE: 69 MMHG | TEMPERATURE: 98.2 F | WEIGHT: 202 LBS | BODY MASS INDEX: 35.78 KG/M2

## 2018-04-23 DIAGNOSIS — M54.12 CERVICAL RADICULOPATHY: ICD-10-CM

## 2018-04-23 DIAGNOSIS — M79.18 MYOFASCIAL PAIN: Primary | ICD-10-CM

## 2018-04-23 PROCEDURE — 97140 MANUAL THERAPY 1/> REGIONS: CPT

## 2018-04-23 PROCEDURE — 97110 THERAPEUTIC EXERCISES: CPT

## 2018-04-23 PROCEDURE — 97032 APPL MODALITY 1+ESTIM EA 15: CPT

## 2018-04-23 RX ORDER — HYDROGEN PEROXIDE 3 %
SOLUTION, NON-ORAL MISCELLANEOUS DAILY
COMMUNITY

## 2018-04-23 NOTE — PROGRESS NOTES
PT DAILY TREATMENT NOTE     Patient Name: Jenn Espinosa  Date:2018  : 1955  [x]  Patient  Verified  Payor: BLUE [place]OSS / Plan: Rosalie White [id number] PPO / Product Type: PPO /    In time:5:00  Out time:5:40  Total Treatment Time (min): 40  Visit #: 6 of 8    Treatment Area: Myalgia [M79.1]    SUBJECTIVE  Pain Level (0-10 scale): 0  Any medication changes, allergies to medications, adverse drug reactions, diagnosis change, or new procedure performed?: [x] No    [] Yes (see summary sheet for update)  Subjective functional status/changes:   [] No changes reported  \"I had maybe 1-2 instances of pain over the past week or two\"    OBJECTIVE    Modality rationale: decrease pain and increase tissue extensibility to improve the patients ability to perform daily tasks and ADLs   Min Type Additional Details    [] Estim:  []Unatt       []IFC  []Premod                        []Other:  []w/ice   []w/heat  Position:  Location:   5+3 [x] Estim: [x]Att    []TENS instruct  []NMES                    []Other:  [x]w/US   []w/ice   []w/heat  Position: seated  Location: left UT/LS    []  Traction: [] Cervical       []Lumbar                       [] Prone          []Supine                       []Intermittent   []Continuous Lbs:  [] before manual  [] after manual    []  Ultrasound: []Continuous   [] Pulsed                           []1MHz   []3MHz W/cm2:  Location:    []  Iontophoresis with dexamethasone         Location: [] Take home patch   [] In clinic    []  Ice     []  heat  []  Ice massage  []  Laser   []  Anodyne Position:  Location:    []  Laser with stim  []  Other:  Position:  Location:    []  Vasopneumatic Device Pressure:       [] lo [] med [] hi   Temperature: [] lo [] med [] hi   [] Skin assessment post-treatment:  []intact []redness- no adverse reaction    []redness - adverse reaction:       24 min Therapeutic Exercise:  [] See flow sheet :   Rationale: increase ROM and increase strength to improve the patients ability to perform daily tasks and ADLs    8 min Manual Therapy:  PROM right shoulder flex/abd gentle IR/ER stretching   Rationale: increase ROM and increase tissue extensibility to improve functional mobility and ADL performance          With   [] TE   [] TA   [] neuro   [] other: Patient Education: [x] Review HEP    [] Progressed/Changed HEP based on:   [] positioning   [] body mechanics   [] transfers   [] heat/ice application    [] other:      Other Objective/Functional Measures: added 1# weight to s/l AROM , pt begins to fatigue but reports no pain     Pain Level (0-10 scale) post treatment: 0     ASSESSMENT/Changes in Function: Pt has progressed quite well with PT thus far and reports f/u with MD today at which planned injection was canceled. She is still tight into ER at 90 deg ABD, will continue to progress per POC. Educated pt to continue IR/ER stretching at home    Patient will continue to benefit from skilled PT services to modify and progress therapeutic interventions, address functional mobility deficits, address ROM deficits, address strength deficits, analyze and address soft tissue restrictions, analyze and cue movement patterns and analyze and modify body mechanics/ergonomics to attain remaining goals. []  See Plan of Care  []  See progress note/recertification  []  See Discharge Summary         Progress towards goals / Updated goals:  Short Term Goals: To be accomplished in 2 weeks:  1. Pt will be I and compliant with HEP 2x/day to promote self management of condition - Pt reports HEP compliance. 4/3/2018  2. Pt will demonstrate pain free left shoulder ABD to 90 deg for improved ease of ADLs.  - Met, 120 degrees. 4/10/2018  Long Term Goals: To be accomplished in 4 weeks:  1. Pt will improve FOTO to 63 to indicate improved function with daily activities Met 68 4/12/18  2.  Pt will demonstrate shoulder flexion to 120 deg without pain or shoulder hiking for improved functional mobility. Met 130 deg 4/23/18  3. Pt will demonstrate left shoulder FIR to L2 without pain for improved ease of dressing. Met FIR to L2 4/12/18  4. Pt will report little difficulty lifting an object overhead for return to PLOF.     PLAN  []  Upgrade activities as tolerated     []  Continue plan of care  []  Update interventions per flow sheet       []  Discharge due to:_  []  Other:_      Victoria Humphries DPT, CMTPT 4/23/2018  5:10 PM    Future Appointments  Date Time Provider Cesar Ji   4/25/2018 5:00 PM Casey Antonio, [id number]

## 2018-04-23 NOTE — PROGRESS NOTES
[address]  [address], [address]  [place]  Phone: [phone]  Fax: [phone]        Vasile Figueroa  : 1955  PCP: Jeanine Browne, PHD  2018    PROGRESS NOTE      ASSESSMENT AND PLAN    Jennifer Fregoso comes in to the office today for MRI f/u. She has seen significant improvement of her neck pain and LUE paraesthesia. She rates her pain as a 1/10 today. Her MRI reveals multilevel degenerative changes. At C3/4 there is degenerative anterolisthesis. It also reveals degenerative changes at C4/5 through C6/7 where there is cord contact. There is no cord impingement at this time. It also reveals foraminal stenoses most pronounced bilaterally at C5/6. We discussed the option of a cervical interlaminar injection in the future if her symptoms worsen. Pt will f/u in 6 months or sooner as needed. Diagnoses and all orders for this visit:    1. Myofascial pain    2. Cervical radiculopathy       Follow-up Disposition: Not on File      HISTORY OF PRESENT ILLNESS  Jennifer Fregoso is a 59 y.o. female. A&P / HPI from 3/27/18:  Jennifer Fregoso comes in to the office today c/o chronic neck and left shoulder pain with occasional paraesthesia in her LUE, worse over the last 2 weeks. She rates her pain as an 8/10 today.     Her paraesthesia crosses multiple dermatomes, particularly C5-C8. The majority of her pain is in her neck. On examination, she had a generalized weakness in her LUE and a negative Hawkin's sign on her L. She had tenderness to palpation of her trapezius and levator scapulae on the L with trigger points. Her pain is likely myofascial in nature, but her generalized LUE weakness is worrying.      I referred for a cervical MRI for further evaluation of her LUE weakness.  I also prescribed a prednisone pack and referred to PT with dry needling.     I advised on proper biomechanics and posture.     Pt will f/u in 2 weeks or sooner if needed. HISTORY OF PRESENT ILLNESS  Munir Bustillos is a 65 y.o. female c/o chronic neck pain radiating into her left shoulder and she has occasional paraesthesia in her LUE, worse over the last 2 weeks.     She had a cortisone shot in her left shoulder that relieved 80% of her pain. She has also had massage, ice and heat therapy, and Flexeril at night that have all provided temporary relief of her pain. She notes her pain is worse at the end of the work day than at the beginning.     She works as a . She is accompanied by her .     Pt denies any fevers, chills, nausea, vomiting. Pt denies any chest pain and shortness of breath. Pt denies any ear, nose, and throat problems. Pt denies any fecal or urinary incontinence. Updates from 04/23/18:  Pt presents for MRI f/u. She has seen significant improvement of her neck pain with PT and no longer experiences LUE paraesthesia. She rates her pain as a 1/10 today. PAST MEDICAL HISTORY   No past medical history on file. No past surgical history on file. Katelyn Ceballos MEDICATIONS    Current Outpatient Prescriptions   Medication Sig Dispense Refill    esomeprazole (NEXIUM) 20 mg capsule Take  by mouth daily.  atorvastatin (LIPITOR) 40 mg tablet TAKE 1 TABLET BY MOUTH EVERY DAY FOR CHOLESTEROL  1    fenofibrate (LOFIBRA) 160 mg tablet       BYSTOLIC 10 mg tablet       metFORMIN (GLUCOPHAGE) 500 mg tablet 500 mg.      valsartan (DIOVAN) 320 mg tablet       cyclobenzaprine (FLEXERIL) 10 mg tablet TAKE 1 TAB BEFORE BEDTIME, CAUTION SEDATION.  CAN TAKE 3 X A DAY WHEN NOT WORKING/OR DRIVING.  0        ALLERGIES  Allergies   Allergen Reactions    Penicillin G Rash    Sulfa (Sulfonamide Antibiotics) Rash          SOCIAL HISTORY    Social History     Social History    Marital status: UNKNOWN     Spouse name: N/A    Number of children: N/A    Years of education: N/A     Social History Main Topics    Smoking status: Never Smoker    Smokeless tobacco: Never Used    Alcohol use No    Drug use: No    Sexual activity: Not on file     Other Topics Concern    Not on file     Social History Narrative    No narrative on file       FAMILY HISTORY  No family history on file. REVIEW OF SYSTEMS  Review of Systems   Musculoskeletal: Positive for neck pain. LUE paraesthesia          PHYSICAL EXAMINATION  Visit Vitals    /69  Comment: recheck rt arm 192/80    Pulse 72    Temp 98.2 °F (36.8 °C)    Resp 18    Wt 202 lb (91.6 kg)    SpO2 98%    BMI 35.78 kg/m2       Pain Assessment  3/27/2018   Location of Pain Neck; Shoulder   Severity of Pain 8   Quality of Pain Aching   Duration of Pain Persistent   Frequency of Pain Constant           Constitutional:  Well developed, well nourished, in no acute distress. Psychiatric: Affect and mood are appropriate. Integumentary: No rashes or abrasions noted on exposed areas. SPINE/MUSCULOSKELETAL EXAM    Cervical spine:  Neck is midline. Normal muscle tone. No focal atrophy is noted. ROM pain free. Shoulder ROM intact. Tenderness to palpation of trapezius and levator scapulae on the L. Negative Spurling's sign. Negative Tinel's sign. Negative Baig's sign.       Sensation in the bilateral arms grossly intact to light touch.      Lumbar spine:  No rash, ecchymosis, or gross obliquity. No fasciculations. No focal atrophy is noted. No pain with hip ROM. Full range of motion. No tenderness to palpation. No tenderness to palpation at the sciatic notch. SI joints non-tender. Trochanters non tender.      Sensation in the bilateral legs grossly intact to light touch.     MOTOR:      Biceps  Triceps Deltoids Wrist Ext Wrist Flex Hand Intrin   Right 5/5 5/5 5/5 5/5 5/5 5/5   Left 5/5 5/5 5/5 5/5 5/5 5/5             Hip Flex  Quads Hamstrings Ankle DF EHL Ankle PF   Right 5/5 5/5 5/5 5/5 5/5 5/5   Left 5/5 5/5 5/5 5/5 5/5 5/5   Pain limited exam due to left shoulder pain.     DTRs are 1+ biceps, triceps, brachioradialis, patella, and Achilles.     Negative Straight Leg raise. Squat not tested. No difficulty with tandem gait.      Ambulation without assistive device. FWB.       RADIOGRAPHS  Cervical MRI from 4/4/18 personally reviewed with patient:  FINDINGS:      There is straightening of cervical lordosis with trace anterolisthesis of C3 in  relation to C4. Mild loss of vertebral body height at C6 and C7, likely  degenerative in etiology. Craniocervical junction and posterior elements are  intact. Prevertebral soft tissues are normal. Cervical spinal cord maintains  normal caliber, signal intensity and morphology throughout.     There may be mild or borderline development of cervical stenosis, with  superimposed degenerative changes further detailed at each level below.     C2/C3: Central canal and neural foramina are patent.     C3/C4: Small disc protrusion. No impact upon the cord. Left neural foramen is  patent. Right neural foramen may be mildly to moderately stenotic.     C4/C5: Broad-based disc protrusion. Minimal contact of the ventral cord. Mild to  moderate bilateral foraminal stenosis due to uncovertebral spurring and facet  hypertrophy.     C5/C6: Moderate loss of disc height. Disc osteophyte complex flattens the  ventral cord. Minimal CSF is preserved dorsal to the cord. Moderate bilateral  foraminal stenosis primarily mediated by uncovertebral spurring. Residual AP  canal diameter estimated 7.5 mm.     C6/C7: Broad-based disc protrusion eccentric towards the left. Minimal  flattening of the ventral cord. Trace CSF is preserved dorsal to the cord. Residual AP canal diameter estimated 6 mm.  Moderate right and possible mild to  moderate left foraminal stenosis.     C7/T1: Central canal and neural foramina are patent.     T1/T2 through T4/T5: Central canal and neural foramina are patent.     IMPRESSION  IMPRESSION:     Multilevel degenerative changes of the cervical spine are most pronounced at  C3/C4 where there is trace degenerative anterolisthesis, followed by C4/C5  through C6/C7 where there is cord contact. No cord impingement or myelomalacia  at this time.     Foraminal stenoses are most pronounced bilaterally at C5/C6 and along the right  at C6/C7 where there are moderate foraminal stenoses. Cervical spine XR with US images taken on 3/16/18 personally reviewed with patient:  FINDINGS:     Some views are limited by motion. There is straightening of the normal cervical lordosis. The anterior neural arch of C1 is in close approximation to the odontoid process with proper alignment of the lateral masses of C1 and C2. Prevertebral soft tissues are unremarkable. Vertebral body heights are maintained. There is intervertebral disc space narrowing with endplate osteophytosis from C4-C7. There are degenerative changes throughout the facet and uncovertebral joints resulting in mild left neuroforaminal narrowing at C4-C5 and mild right neuroforaminal narrowing at C4-C5 and C5-C6. The lung apices are clear.     IMPRESSION:     Degenerative disc disease from C4-C7 with diffuse cervical facet arthropathy. There is mild bilateral neuroforaminal stenosis. If there are true radicular type symptoms, consider MRI for further evaluation. 12 minutes of face-to-face contact were spent with the patient during today's visit extensively discussing symptoms and treatment plan. All questions were answered. More than half of this visit today was spent on counseling.      Written by Neida Chauhan as dictated by Win Strickland MD

## 2018-04-25 ENCOUNTER — HOSPITAL ENCOUNTER (OUTPATIENT)
Dept: PHYSICAL THERAPY | Age: 63
Discharge: HOME | End: 2018-04-25
Payer: COMMERCIAL

## 2018-04-25 PROCEDURE — 97140 MANUAL THERAPY 1/> REGIONS: CPT

## 2018-04-25 PROCEDURE — 97110 THERAPEUTIC EXERCISES: CPT

## 2018-04-25 NOTE — PROGRESS NOTES
PT DAILY TREATMENT NOTE     Patient Name: Neli Allen  Date:2018  : 1955  [x]  Patient  Verified  Payor: BLUE CROSS / Plan: [employer] [id number] PPO / Product Type: PPO /    In time:5:00  Out time:5:35  Total Treatment Time (min): 35  Visit #: 7 of 8    Treatment Area: Myalgia [M79.1]    SUBJECTIVE  Pain Level (0-10 scale): 0/10  Any medication changes, allergies to medications, adverse drug reactions, diagnosis change, or new procedure performed?: [x] No    [] Yes (see summary sheet for update)  Subjective functional status/changes:   [] No changes reported  \"Neck is doing a whole lot better, shoulder still bothering me. \"    OBJECTIVE    27 min Therapeutic Exercise:  [x] See flow sheet :   Rationale: increase ROM, increase strength and increase proprioception to improve the patients ability to perform ADL's.    8 min Manual Therapy:  Left pec minor, subscap and infra release. ST/GH joint mobs grade II-III. Shoulder PROM. Rationale: decrease pain, increase ROM, increase tissue extensibility and decrease trigger points to improve OH activity tolerance. With   [x] TE   [] TA   [] neuro   [] other: Patient Education: [x] Review HEP    [] Progressed/Changed HEP based on:   [] positioning   [] body mechanics   [] transfers   [] heat/ice application    [] other:      Other Objective/Functional Measures:      Pain Level (0-10 scale) post treatment: C/S 0/10, Shoulder 3/10    ASSESSMENT/Changes in Function: Continues to have limited Left shoulder ER. Flexion and IR have improved considerable however were not formally reassessed today. Patient will continue to benefit from skilled PT services to modify and progress therapeutic interventions, address functional mobility deficits, address ROM deficits, address strength deficits, analyze and address soft tissue restrictions and analyze and modify body mechanics/ergonomics to attain remaining goals.      [x]  See Plan of Care  []  See progress note/recertification  []  See Discharge Summary         Progress towards goals / Updated goals:  Short Term Goals: To be accomplished in 2 weeks:  1. Pt will be I and compliant with HEP 2x/day to promote self management of condition - Pt reports HEP compliance. 4/3/2018  2. Pt will demonstrate pain free left shoulder ABD to 90 deg for improved ease of ADLs.  - Met, 120 degrees. 4/10/2018  Long Term Goals: To be accomplished in 4 weeks:  1. Pt will improve FOTO to 63 to indicate improved function with daily activities Met 68 4/12/18  2. Pt will demonstrate shoulder flexion to 120 deg without pain or shoulder hiking for improved functional mobility. Met 130 deg 4/23/18  3. Pt will demonstrate left shoulder FIR to L2 without pain for improved ease of dressing. Met FIR to L2 4/12/18  4. Pt will report little difficulty lifting an object overhead for return to PLOF. PLAN  []  Upgrade activities as tolerated     [x]  Continue plan of care  []  Update interventions per flow sheet       []  Discharge due to:_  [x]  Other:_   Pt scheduling 1 additional F/U PT appointment.     Nydia Dougherty PTA 4/25/2018  4:51 PM    Future Appointments  Date Time Provider Cesar Ji   4/25/2018 5:00 PM Nydia Dougherty PTA [id number]

## 2018-04-30 ENCOUNTER — HOSPITAL ENCOUNTER (OUTPATIENT)
Dept: PHYSICAL THERAPY | Age: 63
Discharge: HOME | End: 2018-04-30
Payer: COMMERCIAL

## 2018-04-30 PROCEDURE — 97140 MANUAL THERAPY 1/> REGIONS: CPT

## 2018-04-30 PROCEDURE — 97110 THERAPEUTIC EXERCISES: CPT

## 2018-04-30 NOTE — PROGRESS NOTES
PT DAILY TREATMENT NOTE     Patient Name: Seble Austin  Date:2018  : 1955  [x]  Patient  Verified  Payor: BLUE CROSS / Plan: [facility] PPO / Product Type: PPO /    In time:9:00  Out time:9:54  Total Treatment Time (min): 47  Visit #: 8 of 8    Treatment Area: Myalgia [M79.1]    SUBJECTIVE  Pain Level (0-10 scale): 0  Any medication changes, allergies to medications, adverse drug reactions, diagnosis change, or new procedure performed?: [x] No    [] Yes (see summary sheet for update)  Subjective functional status/changes:   [] No changes reported  Pt reports feeling some discomfort in her left shoulder while holding a baby in the [facility] nursery for a while yesterday    OBJECTIVE    Modality rationale: decrease pain to improve the patients ability to perform ADLs   Min Type Additional Details    [] Estim:  []Unatt       []IFC  []Premod                        []Other:  []w/ice   []w/heat  Position:  Location:    [] Estim: []Att    []TENS instruct  []NMES                    []Other:  []w/US   []w/ice   []w/heat  Position:  Location:    []  Traction: [] Cervical       []Lumbar                       [] Prone          []Supine                       []Intermittent   []Continuous Lbs:  [] before manual  [] after manual    []  Ultrasound: []Continuous   [] Pulsed                           []1MHz   []3MHz W/cm2:  Location:    []  Iontophoresis with dexamethasone         Location: [] Take home patch   [] In clinic   10 [x]  Ice     []  heat  []  Ice massage  []  Laser   []  Anodyne Position: seated  Location: Left shoulder    []  Laser with stim  []  Other:  Position:  Location:    []  Vasopneumatic Device Pressure:       [] lo [] med [] hi   Temperature: [] lo [] med [] hi   [] Skin assessment post-treatment:  []intact []redness- no adverse reaction    []redness - adverse reaction:       36 min Therapeutic Exercise:  [] See flow sheet :   Rationale: increase ROM and increase strength to improve the patients ability to perform daily tasks and ADLs      8 min Manual Therapy:  Left shoulder gentle capsule stretching   Rationale: increase ROM and increase tissue extensibility to improve ease of self care and ADL          With   [] TE   [] TA   [] neuro   [] other: Patient Education: [x] Review HEP    [] Progressed/Changed HEP based on:   [] positioning   [] body mechanics   [] transfers   [] heat/ice application    [] other:      Other Objective/Functional Measures: pt reports discomfort with ABD PROM at 90 deg, possible arthritic component. also reports some UT discomfort with s/l ABD     Pain Level (0-10 scale) post treatment: 6-7    ASSESSMENT/Changes in Function: Pt has made very good progress in regards to pain free shoulder mobility. At this time she does still demonstrates some pain with shoulder abduction both resisted and unresisted. Will continue PT for 2 more weeks to progress shoulder strengthening and capsule mobility preparing for D/C. Advised pt to f/u with shoulder specialist if pain still remains at end of month. Patient will continue to benefit from skilled PT services to modify and progress therapeutic interventions, address functional mobility deficits, address ROM deficits, address strength deficits, analyze and address soft tissue restrictions, analyze and cue movement patterns and analyze and modify body mechanics/ergonomics to attain remaining goals. []  See Plan of Care  [x]  See progress note/recertification  []  See Discharge Summary         Progress towards goals / Updated goals:  Short Term Goals: To be accomplished in 2 weeks:  1. Pt will be I and compliant with HEP 2x/day to promote self management of condition - Pt reports HEP compliance. 4/3/2018  2. Pt will demonstrate pain free left shoulder ABD to 90 deg for improved ease of ADLs.  - Met, 120 degrees. 4/10/2018  Long Term Goals: To be accomplished in 4 weeks:  1.  Pt will improve FOTO to 63 to indicate improved function with daily activities Met 68 4/12/18  2. Pt will demonstrate shoulder flexion to 120 deg without pain or shoulder hiking for improved functional mobility. Met 130 deg 4/23/18  3. Pt will demonstrate left shoulder FIR to L2 without pain for improved ease of dressing. Met FIR to L2 4/12/18  4. Pt will report little difficulty lifting an object overhead for return to PLOF. Met pt reports no difficulty 4/30/18    PLAN  [x]  Upgrade activities as tolerated     []  Continue plan of care  []  Update interventions per flow sheet       []  Discharge due to:_  []  Other:_      Lee Ann Gunderson DPT, CMTPT 4/30/2018  9:12 AM    No future appointments.

## 2018-04-30 NOTE — PROGRESS NOTES
[facility]  [address]  [place], [address]  [phone] fax    Physical Therapy Progress Note  Patient name: Joby Dyer Start of Care: 3/29/2018   Referral source: Ngoc Guillen MD : 1955                          Medical Diagnosis: Myalgia [M79.1] Onset Date: summer 2017   Treatment Diagnosis: left shoulder and neck pain   Prior Hospitalization: see medical history Provider#: [id number]   Medications: Verified on Patient summary List    Comorbidities: DM, HTN   Prior Level of Function: I with ADLs and IADLs  Visits from Start of Care: 8    Missed Visits: 0      Established Goals:        Excellent         Good         Limited            None  [x] Increased ROM   []  [x]  []  []  [] Increased Strength  []  []  []  []  [] Increased Mobility  []  []  []  []   [x] Decreased Pain   []  [x]  []  []  [] Decreased Swelling  []  []  []  []    Key Functional Changes: improved left shoulder mobility  Short Term Goals: To be accomplished in 2 weeks:  1. Pt will be I and compliant with HEP 2x/day to promote self management of condition - Pt reports HEP compliance. 4/3/2018  2. Pt will demonstrate pain free left shoulder ABD to 90 deg for improved ease of ADLs.  - Met, 120 degrees. 4/10/2018  Long Term Goals: To be accomplished in 4 weeks:  1. Pt will improve FOTO to 63 to indicate improved function with daily activities Met 68 18  2. Pt will demonstrate shoulder flexion to 120 deg without pain or shoulder hiking for improved functional mobility. Met 130 deg 18  3. Pt will demonstrate left shoulder FIR to L2 without pain for improved ease of dressing. Met FIR to L2 18  4. Pt will report little difficulty lifting an object overhead for return to PLOF. Met pt reports no difficulty 18    Updated Goals: to be achieved in 2 weeks:  1. Pt will demonstrate BASILIA left shoulder to C2 for improved self care performance.   2. Pt will demonstrate 4+/5 left shoulder flexion and ABD strength without pain for improved ADL ease. ASSESSMENT/RECOMMENDATIONS: Pt has made very good progress in regards to pain free shoulder mobility. At this time she does still demonstrates some pain with shoulder abduction both resisted and unresisted. Will continue PT for 2 more weeks to progress shoulder strengthening and capsule mobility preparing for D/C. Advised pt to f/u with shoulder specialist if pain still remains at end of month. [x]Continue therapy per initial plan/protocol at a frequency of  2 x per week for 2 weeks  []Continue therapy with the following recommended changes:_____________________      _____________________________________________________________________  []Discontinue therapy progressing towards or have reached established goals  []Discontinue therapy due to lack of appreciable progress towards goals  []Discontinue therapy due to lack of attendance or compliance  []Await Physician's recommendations/decisions regarding therapy  []Other:________________________________________________________________    Thank you for this referral.    Oscar Kothari, YANIRA, CMTPT 4/30/2018 1:35 PM  NOTE TO PHYSICIAN:  PLEASE COMPLETE THE ORDERS BELOW AND   FAX TO [facility]: [phone]  If you are unable to process this request in 24 hours please contact our office: [phone]    ? I have read the above report and request that my patient continue as recommended. ? I have read the above report and request that my patient continue therapy with the following changes/special instructions:__________________________________________________________  ? I have read the above report and request that my patient be discharged from therapy.     Physicians signature: ______________________________Date: ______Time:______

## 2018-05-04 ENCOUNTER — HOSPITAL ENCOUNTER (OUTPATIENT)
Dept: PHYSICAL THERAPY | Age: 63
Discharge: HOME | End: 2018-05-04
Payer: COMMERCIAL

## 2018-05-04 PROCEDURE — 97140 MANUAL THERAPY 1/> REGIONS: CPT

## 2018-05-04 PROCEDURE — 97110 THERAPEUTIC EXERCISES: CPT

## 2018-05-04 PROCEDURE — 97112 NEUROMUSCULAR REEDUCATION: CPT

## 2018-05-04 NOTE — PROGRESS NOTES
PT DAILY TREATMENT NOTE     Patient Name: Torie Ac  Date:2018  : 1955  [x]  Patient  Verified  Payor: BLUE CROSS / Plan: [place] [facility] PPO / Product Type: PPO /    In time:11:30  Out time:12:08  Total Treatment Time (min): 38  Visit #: 1 of 4    Treatment Area: Myalgia [M79.1]    SUBJECTIVE  Pain Level (0-10 scale): 0  Any medication changes, allergies to medications, adverse drug reactions, diagnosis change, or new procedure performed?: [x] No    [] Yes (see summary sheet for update)  Subjective functional status/changes:   [] No changes reported  Pt reports still having some sharp pain with abduction plane movements but otherwise doing well    OBJECTIVE    Modality rationale: PD to improve the patients ability to    Min Type Additional Details    [] Estim:  []Unatt       []IFC  []Premod                        []Other:  []w/ice   []w/heat  Position:  Location:    [] Estim: []Att    []TENS instruct  []NMES                    []Other:  []w/US   []w/ice   []w/heat  Position:  Location:    []  Traction: [] Cervical       []Lumbar                       [] Prone          []Supine                       []Intermittent   []Continuous Lbs:  [] before manual  [] after manual    []  Ultrasound: []Continuous   [] Pulsed                           []1MHz   []3MHz W/cm2:  Location:    []  Iontophoresis with dexamethasone         Location: [] Take home patch   [] In clinic    []  Ice     []  heat  []  Ice massage  []  Laser   []  Anodyne Position:  Location:    []  Laser with stim  []  Other:  Position:  Location:    []  Vasopneumatic Device Pressure:       [] lo [] med [] hi   Temperature: [] lo [] med [] hi   [] Skin assessment post-treatment:  []intact []redness- no adverse reaction    []redness - adverse reaction:       22 min Therapeutic Exercise:  [] See flow sheet :   Rationale: increase ROM and increase strength to improve the patients ability to perform daily tasks and work duties 8 min Neuromuscular Re-education:  []  See flow sheet :   Rationale: increase strength, improve coordination and increase proprioception  to improve the patients ability to activate scapular musculature for improved GH/STJ mechanics    8 min Manual Therapy:  Gentle capsular stretching left shoulder, PROM flexion/scaption/ABD to 90 deg, Inf GH mobs grade II-III   Rationale: increase ROM and increase tissue extensibility to improve ease of ADLs and self care            With   [] TE   [] TA   [] neuro   [] other: Patient Education: [x] Review HEP    [] Progressed/Changed HEP based on:   [] positioning   [] body mechanics   [] transfers   [] heat/ice application    [] other:      Other Objective/Functional Measures: educated pt in self Inf GH mob for HEP performance     Pain Level (0-10 scale) post treatment: 2-3 \"sore\"    ASSESSMENT/Changes in Function: Pt with good tolerance to all of today's therex. Educated on attempting some progression of ABD mobility but if still painful to avoid irritating due to possible arthritic component. Will continue with gentle strengthening within pain free ranges for remaining sessions. Patient will continue to benefit from skilled PT services to modify and progress therapeutic interventions, address functional mobility deficits, address ROM deficits, address strength deficits, analyze and address soft tissue restrictions, analyze and cue movement patterns and analyze and modify body mechanics/ergonomics to attain remaining goals. []  See Plan of Care  []  See progress note/recertification  []  See Discharge Summary         Progress towards goals / Updated goals:  Updated Goals: to be achieved in 2 weeks:  1. Pt will demonstrate BASILIA left shoulder to C2 for improved self care performance. Progressing to occiput 5/4/18  2. Pt will demonstrate 4+/5 left shoulder flexion and ABD strength without pain for improved ADL ease.     PLAN  [x]  Upgrade activities as tolerated     [x] Continue plan of care  []  Update interventions per flow sheet       []  Discharge due to:_  []  Other:_      Luis Antonio Rae DPT, CMTPT 5/4/2018  11:34 AM    Future Appointments  Date Time Provider Cesar Ji   5/9/2018 4:00 PM [facility]IE [facility]   5/11/2018 8:30 AM LENNIE [facility]

## 2018-05-09 ENCOUNTER — HOSPITAL ENCOUNTER (OUTPATIENT)
Dept: PHYSICAL THERAPY | Age: 63
Discharge: HOME | End: 2018-05-09
Payer: COMMERCIAL

## 2018-05-09 PROCEDURE — 97112 NEUROMUSCULAR REEDUCATION: CPT

## 2018-05-09 PROCEDURE — 97110 THERAPEUTIC EXERCISES: CPT

## 2018-05-09 PROCEDURE — 97140 MANUAL THERAPY 1/> REGIONS: CPT

## 2018-05-09 NOTE — PROGRESS NOTES
PT DAILY TREATMENT NOTE     Patient Name: Regina Yung  Date:2018  : 1955  [x]  Patient  Verified  Payor: BLUE [place] / Plan: Ros[employer] [id number] PPO / Product Type: PPO /    In time:4:00  Out time:4:31  Total Treatment Time (min): 31  Visit #: 2 of 4    Treatment Area: Myalgia [M79.1]    SUBJECTIVE  Pain Level (0-10 scale): 0/10  Any medication changes, allergies to medications, adverse drug reactions, diagnosis change, or new procedure performed?: [x] No    [] Yes (see summary sheet for update)  Subjective functional status/changes:   [x] No changes reported    OBJECTIVE    15 min Therapeutic Exercise:  [x] See flow sheet :   Rationale: increase ROM and increase strength to improve the patients ability to perform ADL's.    8 min Neuromuscular Re-education:  [x]  See flow sheet :   Rationale: increase strength and increase proprioception  to improve the patients ability to perform functional activities. 8 min Manual Therapy:  DTM/TPR Left UT, lev scap, pec minor release. ST/GH joint mobs grade II-III. Shoulder PROM. Rationale: decrease pain, increase ROM, increase tissue extensibility and decrease trigger points to improve activity tolerance. With   [x] TE   [] TA   [] neuro   [] other: Patient Education: [x] Review HEP    [] Progressed/Changed HEP based on:   [] positioning   [] body mechanics   [] transfers   [] heat/ice application    [] other:      Other Objective/Functional Measures: Added side-lying SA punch 0# 12 reps, increased side-lying ER, abd and flex to 2# of resistance. Pain Level (0-10 scale) post treatment: 2/10    ASSESSMENT/Changes in Function: Pt expressed soreness in Left shoulder post-treatment. Plan to D/C next visit as pt will be out of town for 3 weeks. Pt reports HEP compliance.     Patient will continue to benefit from skilled PT services to modify and progress therapeutic interventions, address functional mobility deficits, address ROM deficits, address strength deficits, analyze and address soft tissue restrictions and analyze and cue movement patterns to attain remaining goals. [x]  See Plan of Care  []  See progress note/recertification  []  See Discharge Summary         Progress towards goals / Updated goals:  Updated Goals: to be achieved in 2 weeks:  1. Pt will demonstrate BASILIA left shoulder to C2 for improved self care performance. Progressing to occiput 5/4/18  2. Pt will demonstrate 4+/5 left shoulder flexion and ABD strength without pain for improved ADL ease.     PLAN  []  Upgrade activities as tolerated     [x]  Continue plan of care  []  Update interventions per flow sheet       []  Discharge due to:_  []  Other:_      Lydia Gaming PTA 5/9/2018  4:07 PM    Future Appointments  Date Time Provider Cesar Ji   5/11/2018 8:30 AM Lydia Gaming PTA [id number]

## 2018-05-11 ENCOUNTER — HOSPITAL ENCOUNTER (OUTPATIENT)
Dept: PHYSICAL THERAPY | Age: 63
Discharge: HOME | End: 2018-05-11
Payer: COMMERCIAL

## 2018-05-11 PROCEDURE — 97110 THERAPEUTIC EXERCISES: CPT

## 2018-05-11 PROCEDURE — 97112 NEUROMUSCULAR REEDUCATION: CPT

## 2018-05-11 PROCEDURE — 97140 MANUAL THERAPY 1/> REGIONS: CPT

## 2018-05-11 NOTE — PROGRESS NOTES
PT DAILY TREATMENT NOTE     Patient Name: Chris Colon  Date:2018  : 1955  [x]  Patient  Verified   Payor: BLUE CROSS / Plan: [employer]al[employer] White [id number] PPO / Product Type: PPO /    In time:8:30  Out time:9:10  Total Treatment Time (min): 40  Visit #: 3 of 4    Treatment Area: Myalgia [M79.1]    SUBJECTIVE  Pain Level (0-10 scale): 0/10  Any medication changes, allergies to medications, adverse drug reactions, diagnosis change, or new procedure performed?: [x] No    [] Yes (see summary sheet for update)  Subjective functional status/changes:   [x] No changes reported    OBJECTIVE    24 min Therapeutic Exercise:  [x] See flow sheet :   Rationale: increase ROM and increase strength to improve the patients ability to perform ADL's.    8 min Neuromuscular Re-education:  [x]  See flow sheet :   Rationale: increase strength and increase proprioception  to improve the patients ability to perform OH activities. 8 min Manual Therapy:  DTM/TPR Left UT, lev scap and pec minor/major. ST/GH joint mobs grade II-III. Shoulder PROM. Rationale: decrease pain, increase ROM, increase tissue extensibility and decrease trigger points to improve OH activity tolerance. With   [x] TE   [] TA   [] neuro   [] other: Patient Education: [x] Review HEP    [] Progressed/Changed HEP based on:   [] positioning   [] body mechanics   [] transfers   [] heat/ice application    [] other:      Other Objective/Functional Measures: MMT Left shoulder 4/5 to 4+/5 grossly except 3/5 abd (limited secondary to pain). AROM Left shoulder BASILIA T2.    Pain Level (0-10 scale) post treatment: 1/10    ASSESSMENT/Changes in Function:     []  See Plan of Care  []  See progress note/recertification  [x]  See Discharge Summary         Progress towards goals / Updated goals:  Updated Goals: to be achieved in 2 weeks:  1. Pt will demonstrate BASILIA left shoulder to C2 for improved self care performance.  Progressing to occiput 18; Met, T2. 5/11/2018  2. Pt will demonstrate 4+/5 left shoulder flexion and ABD strength without pain for improved ADL ease. - MMT Left shoulder 4/5 to 4+/5 grossly except 3/5 abd (limited secondary to pain). 5/11/2018    PLAN  []  Upgrade activities as tolerated     []  Continue plan of care  []  Update interventions per flow sheet       [x]  Discharge due to: Significant progress towards LTG's, pt given discharge instructions and t-band for home use. []  Other:_      Derek Orellana, LENNIE 5/11/2018  8:30 AM    No future appointments.

## 2018-05-11 NOTE — PROGRESS NOTES
[facility]  [address] [facility] [address]  [place], [address]  [phone] fax    Physical Therapy Discharge Summary  Patient name: Joby Dyer Start of Care: 3/29/2018   Referral source: Ngoc Guillen MD : 1955                          Medical Diagnosis: Myalgia [M79.1] Onset Date: summer 2017   Treatment Diagnosis: left shoulder and neck pain   Prior Hospitalization: see medical history Provider#: [id number]   Medications: Verified on Patient summary List    Comorbidities: DM, HTN   Prior Level of Function: I with ADLs and IADLs  Visits from Start of Care: 11    Missed Visits: 1  Reporting Period : 2018 to 2018      Summary of Care:  Updated Goals: to be achieved in 2 weeks:  1. Pt will demonstrate BASILIA left shoulder to C2 for improved self care performance. Progressing to occiput 18; Met, T2. 2018  2. Pt will demonstrate 4+/5 left shoulder flexion and ABD strength without pain for improved ADL ease. - MMT Left shoulder 4/5 to 4+/5 grossly except 3/5 abd (limited secondary to pain). 2018        ASSESSMENT/RECOMMENDATIONS: Pt has progressed very well in regards to pain levels and functional mobility. She does still have some pain into abduction plane, advised pt to limit motion in this plane while utilizing scaption plane as able.  Will D/C to HEP management    [x]Discontinue therapy: [x]Patient has reached or is progressing toward set goals      []Patient is non-compliant or has abdicated      []Due to lack of appreciable progress towards set goals    Viktor Levine DPT, RAHULPT 2018 11:07 AM